# Patient Record
Sex: MALE | Race: OTHER | HISPANIC OR LATINO | URBAN - METROPOLITAN AREA
[De-identification: names, ages, dates, MRNs, and addresses within clinical notes are randomized per-mention and may not be internally consistent; named-entity substitution may affect disease eponyms.]

---

## 2020-03-30 ENCOUNTER — INPATIENT (INPATIENT)
Facility: HOSPITAL | Age: 50
LOS: 1 days | Discharge: HOME | DRG: 177 | End: 2020-04-01
Attending: INTERNAL MEDICINE | Admitting: INTERNAL MEDICINE
Payer: MEDICAID

## 2020-03-30 VITALS
SYSTOLIC BLOOD PRESSURE: 130 MMHG | RESPIRATION RATE: 20 BRPM | OXYGEN SATURATION: 94 % | DIASTOLIC BLOOD PRESSURE: 81 MMHG | TEMPERATURE: 98 F | HEART RATE: 100 BPM

## 2020-03-30 DIAGNOSIS — R68.89 OTHER GENERAL SYMPTOMS AND SIGNS: ICD-10-CM

## 2020-03-30 LAB
ALBUMIN SERPL ELPH-MCNC: 3.3 G/DL — LOW (ref 3.5–5)
ALP SERPL-CCNC: 67 U/L — SIGNIFICANT CHANGE UP (ref 40–120)
ALT FLD-CCNC: 68 U/L DA — HIGH (ref 10–60)
ANION GAP SERPL CALC-SCNC: 9 MMOL/L — SIGNIFICANT CHANGE UP (ref 5–17)
ANISOCYTOSIS BLD QL: SLIGHT — SIGNIFICANT CHANGE UP
AST SERPL-CCNC: 45 U/L — HIGH (ref 10–40)
BASOPHILS # BLD AUTO: 0 K/UL — SIGNIFICANT CHANGE UP (ref 0–0.2)
BASOPHILS NFR BLD AUTO: 0 % — SIGNIFICANT CHANGE UP (ref 0–2)
BILIRUB SERPL-MCNC: 0.8 MG/DL — SIGNIFICANT CHANGE UP (ref 0.2–1.2)
BUN SERPL-MCNC: 23 MG/DL — HIGH (ref 7–18)
CALCIUM SERPL-MCNC: 9 MG/DL — SIGNIFICANT CHANGE UP (ref 8.4–10.5)
CHLORIDE SERPL-SCNC: 108 MMOL/L — SIGNIFICANT CHANGE UP (ref 96–108)
CO2 SERPL-SCNC: 25 MMOL/L — SIGNIFICANT CHANGE UP (ref 22–31)
CREAT SERPL-MCNC: 0.86 MG/DL — SIGNIFICANT CHANGE UP (ref 0.5–1.3)
EOSINOPHIL # BLD AUTO: 0 K/UL — SIGNIFICANT CHANGE UP (ref 0–0.5)
EOSINOPHIL NFR BLD AUTO: 0 % — SIGNIFICANT CHANGE UP (ref 0–6)
GLUCOSE SERPL-MCNC: 121 MG/DL — HIGH (ref 70–99)
HCT VFR BLD CALC: 44.6 % — SIGNIFICANT CHANGE UP (ref 39–50)
HGB BLD-MCNC: 14.8 G/DL — SIGNIFICANT CHANGE UP (ref 13–17)
HYPOCHROMIA BLD QL: SLIGHT — SIGNIFICANT CHANGE UP
LYMPHOCYTES # BLD AUTO: 0.62 K/UL — LOW (ref 1–3.3)
LYMPHOCYTES # BLD AUTO: 8 % — LOW (ref 13–44)
MANUAL SMEAR VERIFICATION: SIGNIFICANT CHANGE UP
MCHC RBC-ENTMCNC: 28.7 PG — SIGNIFICANT CHANGE UP (ref 27–34)
MCHC RBC-ENTMCNC: 33.2 GM/DL — SIGNIFICANT CHANGE UP (ref 32–36)
MCV RBC AUTO: 86.4 FL — SIGNIFICANT CHANGE UP (ref 80–100)
MONOCYTES # BLD AUTO: 1.63 K/UL — HIGH (ref 0–0.9)
MONOCYTES NFR BLD AUTO: 21 % — HIGH (ref 2–14)
NEUTROPHILS # BLD AUTO: 5.36 K/UL — SIGNIFICANT CHANGE UP (ref 1.8–7.4)
NEUTROPHILS NFR BLD AUTO: 69 % — SIGNIFICANT CHANGE UP (ref 43–77)
NRBC # BLD: 0 /100 — SIGNIFICANT CHANGE UP (ref 0–0)
PLAT MORPH BLD: NORMAL — SIGNIFICANT CHANGE UP
PLATELET # BLD AUTO: 468 K/UL — HIGH (ref 150–400)
POIKILOCYTOSIS BLD QL AUTO: SLIGHT — SIGNIFICANT CHANGE UP
POLYCHROMASIA BLD QL SMEAR: SLIGHT — SIGNIFICANT CHANGE UP
POTASSIUM SERPL-MCNC: 4.4 MMOL/L — SIGNIFICANT CHANGE UP (ref 3.5–5.3)
POTASSIUM SERPL-SCNC: 4.4 MMOL/L — SIGNIFICANT CHANGE UP (ref 3.5–5.3)
PROT SERPL-MCNC: 8.4 G/DL — HIGH (ref 6–8.3)
RBC # BLD: 5.16 M/UL — SIGNIFICANT CHANGE UP (ref 4.2–5.8)
RBC # FLD: 13.6 % — SIGNIFICANT CHANGE UP (ref 10.3–14.5)
RBC BLD AUTO: ABNORMAL
SODIUM SERPL-SCNC: 142 MMOL/L — SIGNIFICANT CHANGE UP (ref 135–145)
TROPONIN I SERPL-MCNC: <0.015 NG/ML — SIGNIFICANT CHANGE UP (ref 0–0.04)
VARIANT LYMPHS # BLD: 2 % — SIGNIFICANT CHANGE UP (ref 0–6)
WBC # BLD: 7.77 K/UL — SIGNIFICANT CHANGE UP (ref 3.8–10.5)
WBC # FLD AUTO: 7.77 K/UL — SIGNIFICANT CHANGE UP (ref 3.8–10.5)

## 2020-03-30 PROCEDURE — 71045 X-RAY EXAM CHEST 1 VIEW: CPT | Mod: 26

## 2020-03-30 PROCEDURE — 99285 EMERGENCY DEPT VISIT HI MDM: CPT

## 2020-03-30 RX ORDER — ALBUTEROL 90 UG/1
4 AEROSOL, METERED ORAL ONCE
Refills: 0 | Status: COMPLETED | OUTPATIENT
Start: 2020-03-30 | End: 2020-03-30

## 2020-03-30 RX ADMIN — ALBUTEROL 4 PUFF(S): 90 AEROSOL, METERED ORAL at 20:17

## 2020-03-30 NOTE — ED PROVIDER NOTE - PHYSICAL EXAMINATION
CONSTITUTIONAL: Well appearing, awake, alert, oriented to person, place, time/situation and in no apparent distress. EYES: Clear bilaterally, pupils equal, round and reactive to light. CARDIAC: Tachycardic, regular rhythm.  Heart sounds S1, S2. RESPIRATORY: Mild distress. On ambulation tachypneic at RR 34 and O2 sat drops to 89-90% RA. Stops 1-2 times in the middle of the sentence to breathe. Bilateral crackles at bases. GASTROINTESTINAL: Abdomen soft, non-tender, no guarding. NEUROLOGICAL: Alert and oriented, no focal deficits, grossly intact. SKIN: Skin normal color for race, warm, dry and intact. No evidence of rash.

## 2020-03-30 NOTE — ED PROVIDER NOTE - ATTENDING CONTRIBUTION TO CARE
Agree with NP H&P.  49 year-old male presents with shortness of breath and myalgias.   Yesterday went to [facility] and was discharged with prescription for Azithromycin and Plaquenil. Pt presents with worsening symptoms.  initial sat 94% however patient desaturates to 89% with ambulation.  Likely covid, labs, CXR, supplemental oxygen, and admit.

## 2020-03-30 NOTE — ED PROVIDER NOTE - OBJECTIVE STATEMENT
ID# [id number] (Severiano): Poor historian. 49 year-old male, unsure of PMHx, presents with cc SOB. Reports that for the last 2 weeks has had fever, cough, V/D, body aches, "lung pain". Yesterday went to [facility] ED for SOB that started yesterday. Was discharged today with Rx for Azithromycin and Plaquenil. Now presents with worsening symptoms.

## 2020-03-31 DIAGNOSIS — Z29.9 ENCOUNTER FOR PROPHYLACTIC MEASURES, UNSPECIFIED: ICD-10-CM

## 2020-03-31 DIAGNOSIS — J18.9 PNEUMONIA, UNSPECIFIED ORGANISM: ICD-10-CM

## 2020-03-31 LAB
24R-OH-CALCIDIOL SERPL-MCNC: 18.1 NG/ML — LOW (ref 30–80)
ANION GAP SERPL CALC-SCNC: 6 MMOL/L — SIGNIFICANT CHANGE UP (ref 5–17)
BUN SERPL-MCNC: 17 MG/DL — SIGNIFICANT CHANGE UP (ref 7–18)
CALCIUM SERPL-MCNC: 8.6 MG/DL — SIGNIFICANT CHANGE UP (ref 8.4–10.5)
CHLORIDE SERPL-SCNC: 109 MMOL/L — HIGH (ref 96–108)
CHOLEST SERPL-MCNC: 137 MG/DL — SIGNIFICANT CHANGE UP (ref 10–199)
CK SERPL-CCNC: 66 U/L — SIGNIFICANT CHANGE UP (ref 35–232)
CO2 SERPL-SCNC: 28 MMOL/L — SIGNIFICANT CHANGE UP (ref 22–31)
CREAT SERPL-MCNC: 0.73 MG/DL — SIGNIFICANT CHANGE UP (ref 0.5–1.3)
CRP SERPL-MCNC: 5.76 MG/DL — HIGH (ref 0–0.4)
D DIMER BLD IA.RAPID-MCNC: 225 NG/ML DDU — SIGNIFICANT CHANGE UP
FERRITIN SERPL-MCNC: 994 NG/ML — HIGH (ref 30–400)
GLUCOSE SERPL-MCNC: 100 MG/DL — HIGH (ref 70–99)
HBA1C BLD-MCNC: 6.3 % — HIGH (ref 4–5.6)
HCT VFR BLD CALC: 40.3 % — SIGNIFICANT CHANGE UP (ref 39–50)
HDLC SERPL-MCNC: 24 MG/DL — LOW
HGB BLD-MCNC: 13.4 G/DL — SIGNIFICANT CHANGE UP (ref 13–17)
LDH SERPL L TO P-CCNC: 257 U/L — HIGH (ref 120–225)
LIPID PNL WITH DIRECT LDL SERPL: 79 MG/DL — SIGNIFICANT CHANGE UP
MAGNESIUM SERPL-MCNC: 2.6 MG/DL — SIGNIFICANT CHANGE UP (ref 1.6–2.6)
MCHC RBC-ENTMCNC: 28.6 PG — SIGNIFICANT CHANGE UP (ref 27–34)
MCHC RBC-ENTMCNC: 33.3 GM/DL — SIGNIFICANT CHANGE UP (ref 32–36)
MCV RBC AUTO: 86.1 FL — SIGNIFICANT CHANGE UP (ref 80–100)
NRBC # BLD: 0 /100 WBCS — SIGNIFICANT CHANGE UP (ref 0–0)
PHOSPHATE SERPL-MCNC: 3.7 MG/DL — SIGNIFICANT CHANGE UP (ref 2.5–4.5)
PLATELET # BLD AUTO: 431 K/UL — HIGH (ref 150–400)
POTASSIUM SERPL-MCNC: 3.7 MMOL/L — SIGNIFICANT CHANGE UP (ref 3.5–5.3)
POTASSIUM SERPL-SCNC: 3.7 MMOL/L — SIGNIFICANT CHANGE UP (ref 3.5–5.3)
RBC # BLD: 4.68 M/UL — SIGNIFICANT CHANGE UP (ref 4.2–5.8)
RBC # FLD: 13.7 % — SIGNIFICANT CHANGE UP (ref 10.3–14.5)
SARS-COV-2 RNA SPEC QL NAA+PROBE: DETECTED
SODIUM SERPL-SCNC: 143 MMOL/L — SIGNIFICANT CHANGE UP (ref 135–145)
TOTAL CHOLESTEROL/HDL RATIO MEASUREMENT: 5.7 RATIO — SIGNIFICANT CHANGE UP (ref 3.4–9.6)
TRIGL SERPL-MCNC: 169 MG/DL — HIGH (ref 10–149)
TSH SERPL-MCNC: 1.66 UU/ML — SIGNIFICANT CHANGE UP (ref 0.34–4.82)
WBC # BLD: 5.96 K/UL — SIGNIFICANT CHANGE UP (ref 3.8–10.5)
WBC # FLD AUTO: 5.96 K/UL — SIGNIFICANT CHANGE UP (ref 3.8–10.5)

## 2020-03-31 PROCEDURE — 99223 1ST HOSP IP/OBS HIGH 75: CPT

## 2020-03-31 RX ORDER — AZITHROMYCIN 500 MG/1
500 TABLET, FILM COATED ORAL DAILY
Refills: 0 | Status: COMPLETED | OUTPATIENT
Start: 2020-03-31 | End: 2020-03-31

## 2020-03-31 RX ORDER — CEFTRIAXONE 500 MG/1
1000 INJECTION, POWDER, FOR SOLUTION INTRAMUSCULAR; INTRAVENOUS EVERY 24 HOURS
Refills: 0 | Status: DISCONTINUED | OUTPATIENT
Start: 2020-03-31 | End: 2020-04-01

## 2020-03-31 RX ORDER — HYDROXYCHLOROQUINE SULFATE 200 MG
400 TABLET ORAL EVERY 12 HOURS
Refills: 0 | Status: COMPLETED | OUTPATIENT
Start: 2020-03-31 | End: 2020-03-31

## 2020-03-31 RX ORDER — ACETAMINOPHEN 500 MG
650 TABLET ORAL EVERY 6 HOURS
Refills: 0 | Status: DISCONTINUED | OUTPATIENT
Start: 2020-03-31 | End: 2020-04-01

## 2020-03-31 RX ORDER — AZITHROMYCIN 500 MG/1
250 TABLET, FILM COATED ORAL DAILY
Refills: 0 | Status: DISCONTINUED | OUTPATIENT
Start: 2020-04-01 | End: 2020-04-01

## 2020-03-31 RX ORDER — THIAMINE MONONITRATE (VIT B1) 100 MG
100 TABLET ORAL DAILY
Refills: 0 | Status: DISCONTINUED | OUTPATIENT
Start: 2020-03-31 | End: 2020-04-01

## 2020-03-31 RX ORDER — ENOXAPARIN SODIUM 100 MG/ML
40 INJECTION SUBCUTANEOUS DAILY
Refills: 0 | Status: DISCONTINUED | OUTPATIENT
Start: 2020-03-31 | End: 2020-04-01

## 2020-03-31 RX ORDER — HYDROXYCHLOROQUINE SULFATE 200 MG
TABLET ORAL
Refills: 0 | Status: DISCONTINUED | OUTPATIENT
Start: 2020-03-31 | End: 2020-04-01

## 2020-03-31 RX ORDER — HYDROXYCHLOROQUINE SULFATE 200 MG
200 TABLET ORAL EVERY 12 HOURS
Refills: 0 | Status: DISCONTINUED | OUTPATIENT
Start: 2020-04-01 | End: 2020-04-01

## 2020-03-31 RX ORDER — ASCORBIC ACID 60 MG
500 TABLET,CHEWABLE ORAL DAILY
Refills: 0 | Status: DISCONTINUED | OUTPATIENT
Start: 2020-03-31 | End: 2020-04-01

## 2020-03-31 RX ADMIN — ENOXAPARIN SODIUM 40 MILLIGRAM(S): 100 INJECTION SUBCUTANEOUS at 18:20

## 2020-03-31 RX ADMIN — CEFTRIAXONE 100 MILLIGRAM(S): 500 INJECTION, POWDER, FOR SOLUTION INTRAMUSCULAR; INTRAVENOUS at 06:16

## 2020-03-31 RX ADMIN — Medication 400 MILLIGRAM(S): at 13:25

## 2020-03-31 RX ADMIN — AZITHROMYCIN 500 MILLIGRAM(S): 500 TABLET, FILM COATED ORAL at 18:20

## 2020-03-31 RX ADMIN — Medication 100 MILLIGRAM(S): at 13:24

## 2020-03-31 RX ADMIN — Medication 400 MILLIGRAM(S): at 06:16

## 2020-03-31 RX ADMIN — Medication 500 MILLIGRAM(S): at 13:30

## 2020-03-31 NOTE — PROGRESS NOTE ADULT - PROBLEM SELECTOR PLAN 1
Chest x-ray shows bilateral infiltrate  Pt saturating about 96% on NC @ 3LPM  Suspected COVID-19  F/u COVID-19 PCR  Isolation precautions  Continue Ceftriaxone , Azithromycin  Continue Hydroxychloroquine, thiamin, vitamin C  F/u LDH, CRP, serum ferritin, D-dimer. G6PD  -EKG (ordered)  -Guaifenesin ordered PRN for cough  -Tylenol ordered PRN for temp>101.5F

## 2020-03-31 NOTE — DISCHARGE NOTE PROVIDER - NSDCCPCAREPLAN_GEN_ALL_CORE_FT
PRINCIPAL DISCHARGE DIAGNOSIS  Diagnosis: Suspected 2019 novel coronavirus infection  Assessment and Plan of Treatment: -Continue with Contact and Airborne Precautions as directed PRINCIPAL DISCHARGE DIAGNOSIS  Diagnosis: Suspected 2019 novel coronavirus infection  Assessment and Plan of Treatment: -Continue with Contact and Airborne Precautions as directed  CORONAVIRUS INSTRUCTIONS:   Based on your current clinical status and stability, it has been determined that you no longer need hospitalization and can recover while remaining in self-quarantine at home. You should follow the prevention steps below until a healthcare provider or local or state health department says you can return to your normal activities.   1. You should restrict activities outside your home, except for getting medical care.   2. Do not go to work, school, or public areas.   3. Avoid using public transportation, ride-sharing, or taxis.   4. Separate yourself from other people and animals in your home as much as possible.  When you are around other people (e.g., sharing a room or vehicle) you should wear a facemask.  5. Wash your hands often with soap and water for at least 20 seconds, especially after blowing your nose, coughing, or sneezing; going to the bathroom; and before eating or preparing food.  6. Cover your mouth and nose with a tissue when you cough or sneeze. Throw used tissues in a lined trash can. Immediately wash your hands with soap and water for at least 20 seconds  7. High touch surfaces include counters, tabletops, doorknobs, bathroom fixtures, toilets, phones, keyboards, tablets, and bedside tables.  8. Avoid sharing dishes, drinking glasses, cups, eating utensils, towels, or bedding with other people or pets in your home. After using these items, they should be washed thoroughly with soap and water.  You are strongly advised to seek prompt medical attention if your illness worsens or you develop new symptoms like fever or difficulty breathing.   Please call  [phone] to speak with your discharging physician if you have any questions.

## 2020-03-31 NOTE — DISCHARGE NOTE PROVIDER - NSDCFUADDAPPT_GEN_ALL_CORE_FT
Follow Up with Primary Care Provider in 1-2 weeks  if symptoms worsen return to ER  if unable to breath call [phone]

## 2020-03-31 NOTE — H&P ADULT - NSHPSOCIALHISTORY_GEN_ALL_CORE
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 He drinks alcohol occasionally. Denied smoking cigarettes. 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 He drinks alcohol occasionally. Denied smoking cigarettes.

## 2020-03-31 NOTE — H&P ADULT - ENMT
EKG completed Dr. Davila called to check in alpha for copy of ekg, EKG given to Dr. Lara . pt returned to stretcher after assisted to bathroom with 2 person assist. + diarrhea noted. jp care provided. pttol it well. details… No oral lesions; no gross abnormalities

## 2020-03-31 NOTE — PROGRESS NOTE ADULT - PROBLEM SELECTOR PLAN 2
Lovenox for DVT prophylaxis  encouraged patient to perform in-bed exercises of upper and lower extremities

## 2020-03-31 NOTE — H&P ADULT - ATTENDING COMMENTS
Pt seen and examined. Case discussed with Housestaff  Agree with HPI, assessment and plan as above.    Vital Signs Last 24 Hrs  T(C): 37.2 (31 Mar 2020 02:57), Max: 37.2 (31 Mar 2020 02:57)  T(F): 99 (31 Mar 2020 02:57), Max: 99 (31 Mar 2020 02:57)  HR: 81 (31 Mar 2020 02:57) (81 - 100)  BP: 109/64 (31 Mar 2020 02:57) (109/64 - 130/81)  RR: 20 (31 Mar 2020 02:57) (18 - 20)  SpO2: 96% (31 Mar 2020 02:57) (94% - 98%)    Middle aged man, NAD on 4LPM of O2 via NC.  SaO2 is 89-90% on room air  Decreased and rhonchorous breath sounds in both lower lobes  RRR S1S2 only   Soft NT ND BS +  No pedal edema  NO focal deficits    Labs                        14.8   7.77  )-----------( 468      ( 30 Mar 2020 19:18 )             44.6     03-30    142  |  108  |  23<H>  ----------------------------<  121<H>  4.4   |  25  |  0.86    Ca    9.0      30 Mar 2020 19:18    TPro  8.4<H>  /  Alb  3.3<L>  /  TBili  0.8  /  DBili  x   /  AST  45<H>  /  ALT  68<H>  /  AlkPhos  67  03-30    Trop negative  CXR - B/L lower lobe infiltrates    Impression  Acute respiratory failure  Suspected COVID 19 infection with bilateral multilobar pneumonia.    A/P  Admit to Medicine with COVID 19 isolation protocols  F/up covid 19 pcr; baseline crp /d-dimer /ferritin /l dh /procalcitonin  Empiric antibiotic with Zithromax and ceftriaxone  Plaquenil regimen  Continue supplemental O2 with continuous SaO2 monitor.  Supportive care- antitussive/antipyretics/pain control

## 2020-03-31 NOTE — H&P ADULT - PROBLEM SELECTOR PLAN 1
Chest x-ray shows bilateral infiltrate  Pt saturating about 94% on room air  Suspected COVID-19  F/u COVID-19 PCR  Isolation precautions  Continue Ceftriaxone , Azithromycin  Continue Hydroxychloroquine, thiamin, vitamin C  F/u LDH, CRP, serum ferritin, D-dimer. G6PD  Oxygen supplementation as needed

## 2020-03-31 NOTE — H&P ADULT - ASSESSMENT
Pt is a 50 y/o M with no significant PMH who presented with generalized body ache, diarrhoea, mild shortness of breath , fever since 2 weeks.   Pt is being admitted for multifocal pneumonia likely secondary to COVID-19

## 2020-03-31 NOTE — H&P ADULT - HISTORY OF PRESENT ILLNESS
Pt is a 50 y/o M with no significant PMH who presented with generlazied body ache, Pt is a 48 y/o M with no significant PMH who presented with generalized body ache, diarrhoea, mild shortness of breath , fever since 2 weeks. According to the pt, he has been having generalized back pain, more on his back since 2 weeks. He has had 5 episodes of  yellowish watery diarrhoea so far. He has mild shortness of breath on exertion. He has subjective low grade fever.  He denied cough, chest pain, abdominal pain, nausea, vomiting and other complains.   He stated that, his brother , who lives with him was diagnosed corona virus. He denied recent travel. He works at kitchen in a restaurant. Pt is a 48 y/o M with no significant PMH who presented with generalized body ache, diarrhoea, mild shortness of breath , fever since 2 weeks. According to the pt, he has been having generalized back pain, more on his back since 2 weeks. He has had 5 episodes of  yellowish watery diarrhoea so far. He has mild shortness of breath on exertion. He has subjective low grade fever.  He denied cough, chest pain, abdominal pain, nausea, vomiting and other complains.   He went to [facility] yesterday and was discharged with azithromycin and Hydroxychloroquine.  He stated that, his brother , who lives with him was diagnosed corona virus. He denied recent travel. He works at kitchen in a restaurant.

## 2020-03-31 NOTE — ED ADULT NURSE NOTE - NSIMPLEMENTINTERV_GEN_ALL_ED
Implemented All Universal Safety Interventions:  Nursery to call system. Call bell, personal items and telephone within reach. Instruct patient to call for assistance. Room bathroom lighting operational. Non-slip footwear when patient is off stretcher. Physically safe environment: no spills, clutter or unnecessary equipment. Stretcher in lowest position, wheels locked, appropriate side rails in place.

## 2020-03-31 NOTE — PATIENT PROFILE ADULT - NSPROGENOTHERPROVIDER_GEN_A_NUR
Informed patient of Dr. Stoddard' recommendation at documented below.  Patient indicated understanding and agreed with the plan.  Will route to Dr. El's office for FYI of patient's recent pulse rate of 40 on 7/18/17.   none

## 2020-03-31 NOTE — PROGRESS NOTE ADULT - SUBJECTIVE AND OBJECTIVE BOX
HPI:  Pt is a 48 y/o M with no significant PMH who presented with generalized body ache, diarrhoea, mild shortness of breath , fever since 2 weeks. According to the pt, he has been having generalized back pain, more on his back since 2 weeks. He has had 5 episodes of  yellowish watery diarrhoea so far. He has mild shortness of breath on exertion. He has subjective low grade fever.  He denied cough, chest pain, abdominal pain, nausea, vomiting and other complains.   He went to [facility] yesterday and was discharged with azithromycin and Hydroxychloroquine.  He stated that, his brother , who lives with him was diagnosed corona virus. He denied recent travel. 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  Patient c/o SOB, productive cough with clear sputum.  Denies Chills. No cp, abdominal pain , N/V/D    MEDICATIONS  (STANDING):  ascorbic acid 500 milliGRAM(s) Oral daily  azithromycin   Tablet 500 milliGRAM(s) Oral daily  cefTRIAXone   IVPB 1000 milliGRAM(s) IV Intermittent every 24 hours  enoxaparin Injectable 40 milliGRAM(s) SubCutaneous daily  hydroxychloroquine   Oral   hydroxychloroquine 400 milliGRAM(s) Oral every 12 hours  thiamine 100 milliGRAM(s) Oral daily        Vital Signs Last 24 Hrs  T(C): 36.8 (31 Mar 2020 08:31), Max: 37.2 (31 Mar 2020 02:57)  T(F): 98.2 (31 Mar 2020 08:31), Max: 99 (31 Mar 2020 02:57)  HR: 77 (31 Mar 2020 08:31) (77 - 100)  BP: 109/70 (31 Mar 2020 08:31) (109/64 - 130/81)  BP(mean): --  RR: 20 (31 Mar 2020 08:31) (18 - 20)  SpO2: 97% (31 Mar 2020 08:31) (94% - 98%)      PHYSICAL EXAM:  GENERAL: NAD, well-developed, Nasal Cannula @ 3LPM  EYES: EOMI, PERRLA, conjunctiva and sclera clear  CHEST/LUNG: breathing non-labored, Clear to auscultation bilaterally; No wheeze  HEART: Regular rate and rhythm;  ABDOMEN: Soft, Nontender, Nondistended;  EXTREMITIES:  2+ Peripheral Pulses, No clubbing, cyanosis, or edema  PSYCH: AAOx3  SKIN: No rashes or lesions    LABS:   COVID = pending	                     13.4   5.96  )-----------( 431      ( 31 Mar 2020 08:00 )             40.3     03-31    143  |  109<H>  |  17  ----------------------------<  100<H>  3.7   |  28  |  0.73    Ca    8.6      31 Mar 2020 08:00  Phos  3.7     03-31  Mg     2.6     03-31    TPro  8.4<H>  /  Alb  3.3<L>  /  TBili  0.8  /  DBili  x   /  AST  45<H>  /  ALT  68<H>  /  AlkPhos  67  03-30        CARDIAC MARKERS ( 31 Mar 2020 08:00 )  x     / x     / 66 U/L / x     / x      CARDIAC MARKERS ( 30 Mar 2020 19:18 )  <0.015 ng/mL / x     / x     / x     / x        RADIOLOGY & ADDITIONAL TESTS:  < from: Xray Chest 1 View- PORTABLE-Urgent (03.30.20 @ 19:43) >  IMPRESSION:   Bilateral multifocal scattered them a pulmonary opacities as described. Findings concerning for bacterial or viral pneumonia..    < end of copied text >

## 2020-03-31 NOTE — H&P ADULT - NEUROLOGICAL DETAILS
deep reflexes intact/alert and oriented x 3/sensation intact/cranial nerves intact/superficial reflexes intact/normal strength

## 2020-03-31 NOTE — H&P ADULT - NSHPLABSRESULTS_GEN_ALL_CORE
14.8   7.77  )-----------( 468      ( 30 Mar 2020 19:18 )             44.6     03-30    142  |  108  |  23<H>  ----------------------------<  121<H>  4.4   |  25  |  0.86    Ca    9.0      30 Mar 2020 19:18    TPro  8.4<H>  /  Alb  3.3<L>  /  TBili  0.8  /  DBili  x   /  AST  45<H>  /  ALT  68<H>  /  AlkPhos  67  03-30

## 2020-03-31 NOTE — PROGRESS NOTE ADULT - ASSESSMENT
Pt is a 48 y/o M with no significant PMH who presented with generalized body ache, diarrhoea, mild shortness of breath , fever since 2 weeks.   Pt is being admitted for multifocal pneumonia likely secondary to COVID-19

## 2020-03-31 NOTE — DISCHARGE NOTE PROVIDER - HOSPITAL COURSE
Pt is a 48 y/o M with no significant PMH who presented with generalized body ache, diarrhoea, mild shortness of breath , fever since 2 weeks. According to the pt, he has been having generalized back pain, more on his back since 2 weeks. He has had 5 episodes of  yellowish watery diarrhoea so far. He has mild shortness of breath on exertion. He has subjective low grade fever. He went to [facility] yesterday and was discharged with azithromycin and Hydroxychloroquine. He stated that, his brother , who lives with him was diagnosed corona virus. He denied recent travel. He works at kitchen in a restaurant.         In ED Pt saturating about 94% on room air. Chest x-ray shows bilateral infiltrate. Pt is being admitted for multifocal pneumonia likely secondary to COVID-19. Pt was started on Ceftriaxone , Azithromycin, Hydroxychloroquine, thiamin, & vitamin C. COVID-19 is pending. Pt is a 48 y/o M with no significant PMH who presented with generalized body ache, diarrhoea, mild shortness of breath , fever since 2 weeks. According to the pt, he has been having generalized back pain, more on his back since 2 weeks. He has had 5 episodes of  yellowish watery diarrhoea so far. He has mild shortness of breath on exertion. He has subjective low grade fever. He went to [facility] yesterday and was discharged with azithromycin and Hydroxychloroquine. He stated that, his brother , who lives with him was diagnosed corona virus. He denied recent travel. He works at kitchen in a restaurant.         In ED Pt saturating about 94% on room air. Chest x-ray shows bilateral infiltrate. Pt is being admitted for multifocal pneumonia likely secondary to COVID-19. Pt was started on Ceftriaxone , Azithromycin, Hydroxychloroquine, thiamin, & vitamin C. COVID-19 is pending.         On 4/1/20, patient is tolerating O2 nasal cannula and saturating 100%. Stable for discharge.

## 2020-03-31 NOTE — H&P ADULT - NEGATIVE OPHTHALMOLOGIC SYMPTOMS
no photophobia/no diplopia/no blurred vision L/no lacrimation L/no lacrimation R/no blurred vision R

## 2020-03-31 NOTE — DISCHARGE NOTE PROVIDER - CARE PROVIDER_API CALL
Mervat Sheth)  Internal Medicine  [address] [address] [place] [address]  Phone: [phone]  Fax: [phone]  Follow Up Time:

## 2020-04-01 VITALS
OXYGEN SATURATION: 96 % | SYSTOLIC BLOOD PRESSURE: 112 MMHG | TEMPERATURE: 98 F | DIASTOLIC BLOOD PRESSURE: 77 MMHG | HEART RATE: 91 BPM | RESPIRATION RATE: 20 BRPM

## 2020-04-01 LAB
ALBUMIN SERPL ELPH-MCNC: 2.8 G/DL — LOW (ref 3.5–5)
ALP SERPL-CCNC: 63 U/L — SIGNIFICANT CHANGE UP (ref 40–120)
ALT FLD-CCNC: 52 U/L DA — SIGNIFICANT CHANGE UP (ref 10–60)
ANION GAP SERPL CALC-SCNC: 9 MMOL/L — SIGNIFICANT CHANGE UP (ref 5–17)
AST SERPL-CCNC: 27 U/L — SIGNIFICANT CHANGE UP (ref 10–40)
BASOPHILS # BLD AUTO: 0.03 K/UL — SIGNIFICANT CHANGE UP (ref 0–0.2)
BASOPHILS NFR BLD AUTO: 0.5 % — SIGNIFICANT CHANGE UP (ref 0–2)
BILIRUB SERPL-MCNC: 0.5 MG/DL — SIGNIFICANT CHANGE UP (ref 0.2–1.2)
BUN SERPL-MCNC: 11 MG/DL — SIGNIFICANT CHANGE UP (ref 7–18)
CALCIUM SERPL-MCNC: 8.9 MG/DL — SIGNIFICANT CHANGE UP (ref 8.4–10.5)
CHLORIDE SERPL-SCNC: 109 MMOL/L — HIGH (ref 96–108)
CO2 SERPL-SCNC: 25 MMOL/L — SIGNIFICANT CHANGE UP (ref 22–31)
CREAT SERPL-MCNC: 0.75 MG/DL — SIGNIFICANT CHANGE UP (ref 0.5–1.3)
EOSINOPHIL # BLD AUTO: 0.05 K/UL — SIGNIFICANT CHANGE UP (ref 0–0.5)
EOSINOPHIL NFR BLD AUTO: 0.9 % — SIGNIFICANT CHANGE UP (ref 0–6)
GLUCOSE SERPL-MCNC: 84 MG/DL — SIGNIFICANT CHANGE UP (ref 70–99)
HCT VFR BLD CALC: 42.9 % — SIGNIFICANT CHANGE UP (ref 39–50)
HGB BLD-MCNC: 14 G/DL — SIGNIFICANT CHANGE UP (ref 13–17)
IMM GRANULOCYTES NFR BLD AUTO: 1.6 % — HIGH (ref 0–1.5)
LYMPHOCYTES # BLD AUTO: 1.28 K/UL — SIGNIFICANT CHANGE UP (ref 1–3.3)
LYMPHOCYTES # BLD AUTO: 22.4 % — SIGNIFICANT CHANGE UP (ref 13–44)
MAGNESIUM SERPL-MCNC: 2.6 MG/DL — SIGNIFICANT CHANGE UP (ref 1.6–2.6)
MCHC RBC-ENTMCNC: 29.1 PG — SIGNIFICANT CHANGE UP (ref 27–34)
MCHC RBC-ENTMCNC: 32.6 GM/DL — SIGNIFICANT CHANGE UP (ref 32–36)
MCV RBC AUTO: 89.2 FL — SIGNIFICANT CHANGE UP (ref 80–100)
MONOCYTES # BLD AUTO: 0.79 K/UL — SIGNIFICANT CHANGE UP (ref 0–0.9)
MONOCYTES NFR BLD AUTO: 13.8 % — SIGNIFICANT CHANGE UP (ref 2–14)
NEUTROPHILS # BLD AUTO: 3.48 K/UL — SIGNIFICANT CHANGE UP (ref 1.8–7.4)
NEUTROPHILS NFR BLD AUTO: 60.8 % — SIGNIFICANT CHANGE UP (ref 43–77)
NRBC # BLD: 0 /100 WBCS — SIGNIFICANT CHANGE UP (ref 0–0)
PHOSPHATE SERPL-MCNC: 3.9 MG/DL — SIGNIFICANT CHANGE UP (ref 2.5–4.5)
PLATELET # BLD AUTO: 419 K/UL — HIGH (ref 150–400)
POTASSIUM SERPL-MCNC: 3.8 MMOL/L — SIGNIFICANT CHANGE UP (ref 3.5–5.3)
POTASSIUM SERPL-SCNC: 3.8 MMOL/L — SIGNIFICANT CHANGE UP (ref 3.5–5.3)
PROT SERPL-MCNC: 7.3 G/DL — SIGNIFICANT CHANGE UP (ref 6–8.3)
RBC # BLD: 4.81 M/UL — SIGNIFICANT CHANGE UP (ref 4.2–5.8)
RBC # FLD: 13.3 % — SIGNIFICANT CHANGE UP (ref 10.3–14.5)
SODIUM SERPL-SCNC: 143 MMOL/L — SIGNIFICANT CHANGE UP (ref 135–145)
WBC # BLD: 5.72 K/UL — SIGNIFICANT CHANGE UP (ref 3.8–10.5)
WBC # FLD AUTO: 5.72 K/UL — SIGNIFICANT CHANGE UP (ref 3.8–10.5)

## 2020-04-01 PROCEDURE — 82955 ASSAY OF G6PD ENZYME: CPT

## 2020-04-01 PROCEDURE — 71045 X-RAY EXAM CHEST 1 VIEW: CPT

## 2020-04-01 PROCEDURE — 80048 BASIC METABOLIC PNL TOTAL CA: CPT

## 2020-04-01 PROCEDURE — 82550 ASSAY OF CK (CPK): CPT

## 2020-04-01 PROCEDURE — 85379 FIBRIN DEGRADATION QUANT: CPT

## 2020-04-01 PROCEDURE — 84443 ASSAY THYROID STIM HORMONE: CPT

## 2020-04-01 PROCEDURE — 80053 COMPREHEN METABOLIC PANEL: CPT

## 2020-04-01 PROCEDURE — 83036 HEMOGLOBIN GLYCOSYLATED A1C: CPT

## 2020-04-01 PROCEDURE — 82728 ASSAY OF FERRITIN: CPT

## 2020-04-01 PROCEDURE — 86140 C-REACTIVE PROTEIN: CPT

## 2020-04-01 PROCEDURE — 36415 COLL VENOUS BLD VENIPUNCTURE: CPT

## 2020-04-01 PROCEDURE — 83735 ASSAY OF MAGNESIUM: CPT

## 2020-04-01 PROCEDURE — 83615 LACTATE (LD) (LDH) ENZYME: CPT

## 2020-04-01 PROCEDURE — 99285 EMERGENCY DEPT VISIT HI MDM: CPT | Mod: 25

## 2020-04-01 PROCEDURE — 84100 ASSAY OF PHOSPHORUS: CPT

## 2020-04-01 PROCEDURE — 94640 AIRWAY INHALATION TREATMENT: CPT

## 2020-04-01 PROCEDURE — 80061 LIPID PANEL: CPT

## 2020-04-01 PROCEDURE — 93005 ELECTROCARDIOGRAM TRACING: CPT

## 2020-04-01 PROCEDURE — 84484 ASSAY OF TROPONIN QUANT: CPT

## 2020-04-01 PROCEDURE — 82306 VITAMIN D 25 HYDROXY: CPT

## 2020-04-01 PROCEDURE — 85027 COMPLETE CBC AUTOMATED: CPT

## 2020-04-01 PROCEDURE — 87635 SARS-COV-2 COVID-19 AMP PRB: CPT

## 2020-04-01 PROCEDURE — 99238 HOSP IP/OBS DSCHRG MGMT 30/<: CPT

## 2020-04-01 RX ORDER — ASCORBIC ACID 60 MG
1 TABLET,CHEWABLE ORAL
Qty: 0 | Refills: 0 | DISCHARGE
Start: 2020-04-01

## 2020-04-01 RX ADMIN — Medication 100 MILLIGRAM(S): at 14:04

## 2020-04-01 RX ADMIN — ENOXAPARIN SODIUM 40 MILLIGRAM(S): 100 INJECTION SUBCUTANEOUS at 14:05

## 2020-04-01 RX ADMIN — Medication 200 MILLIGRAM(S): at 02:09

## 2020-04-01 RX ADMIN — AZITHROMYCIN 250 MILLIGRAM(S): 500 TABLET, FILM COATED ORAL at 14:04

## 2020-04-01 RX ADMIN — Medication 500 MILLIGRAM(S): at 14:04

## 2020-04-01 RX ADMIN — Medication 200 MILLIGRAM(S): at 14:04

## 2020-04-01 RX ADMIN — CEFTRIAXONE 100 MILLIGRAM(S): 500 INJECTION, POWDER, FOR SOLUTION INTRAMUSCULAR; INTRAVENOUS at 05:17

## 2020-04-01 NOTE — DISCHARGE NOTE NURSING/CASE MANAGEMENT/SOCIAL WORK - PATIENT PORTAL LINK FT
You can access the FollowMyHealth Patient Portal offered by [facility] by registering at the following website: [email or web address]. By joining [employer]’s FollowMyHealth portal, you will also be able to view your health information using other applications (apps) compatible with our system.

## 2020-04-01 NOTE — PROGRESS NOTE ADULT - SUBJECTIVE AND OBJECTIVE BOX
AMERICA MCMAHAN  49y  Male    Subjective:   Patient seen and evaluated at bedside. Patient sitting up in bed.             Vital Signs Last 24 Hrs  T(C): 36.6 (01 Apr 2020 08:22), Max: 37.2 (31 Mar 2020 17:27)  T(F): 97.9 (01 Apr 2020 08:22), Max: 98.9 (31 Mar 2020 17:27)  HR: 77 (01 Apr 2020 08:22) (76 - 99)  BP: 109/76 (01 Apr 2020 08:22) (109/75 - 135/73)  BP(mean): --  RR: 18 (01 Apr 2020 08:22) (18 - 19)  SpO2: 99% (01 Apr 2020 08:22) (95% - 100%)    PHYSICAL EXAM:  GENERAL: NAD, well-groomed, well-developed  HEENT - NC/AT, pupils equal and reactive to light,  ; Moist mucous membranes, Good dentition, No lesions  NECK: Supple, No JVD  CHEST/LUNG: Clear to auscultation bilaterally; No rales, rhonchi, wheezing  HEART: Regular rate and rhythm; No murmurs, rubs, or gallops  ABDOMEN: Soft, Nontender, Nondistended; Bowel sounds present  EXTREMITIES:  2+ Peripheral Pulses, No clubbing, cyanosis, or edema  NEURO:  No Focal deficits, sensory and motor intact  SKIN: No rashes or lesions      MedsMEDICATIONS  (STANDING):  ascorbic acid 500 milliGRAM(s) Oral daily  azithromycin   Tablet 250 milliGRAM(s) Oral daily  cefTRIAXone   IVPB 1000 milliGRAM(s) IV Intermittent every 24 hours  enoxaparin Injectable 40 milliGRAM(s) SubCutaneous daily  hydroxychloroquine   Oral   hydroxychloroquine 200 milliGRAM(s) Oral every 12 hours  thiamine 100 milliGRAM(s) Oral daily    MEDICATIONS  (PRN):  acetaminophen   Tablet .. 650 milliGRAM(s) Oral every 6 hours PRN Temp greater or equal to 38.5C (101.3F)  guaiFENesin   Syrup  (Sugar-Free) 200 milliGRAM(s) Oral every 6 hours PRN Cough      HEALTH ISSUES - PROBLEM Dx:  Prophylactic measure: Prophylactic measure  Multifocal pneumonia: Multifocal pneumonia

## 2020-04-02 LAB — G6PD RBC-CCNC: 14.9 U/G HGB — SIGNIFICANT CHANGE UP (ref 7–20.5)

## 2024-01-04 NOTE — ED PROVIDER NOTE - CLINICAL SUMMARY MEDICAL DECISION MAKING FREE TEXT BOX
Hypoxic, tachypneic with minimal acitivity and when talking. Concern for COVID-19. Will bring to main ED. Report given to Dr Orozco. Most likely will need admission. Universal Safety Interventions

## 2025-06-16 NOTE — PATIENT PROFILE ADULT - NSTRANSFERBELONGINGSRESP_GEN_A_NUR
75-year-old male with past medical history of anemia, hypertension, medical history of CAD, aortic stenosis (diameter 1.8cm) now presents with a chief complaint of lightheadedness and chest pain. Patient reports that 4 days prior he had chest pain with grossly unremarkable workup but endorses feeling well since then. Patient tells me he woke up and felt lightheaded with associated chest pain, nausea, shortness of breath. Patient reports he is not currently lightheaded and denies any room spinning or vertiginous symptoms. Patient reports scant chest pain currently present which he endorses is a pressure which is substernal but does not radiate. Patient denies any other associated symptoms including fevers, cough, leg swelling, unilateral weakness.     Follows with Dr. Avalos.    Cardiology consulted for CP.    The patient is a very pleasant 75-year-old man with a history of mild aortic stenosis and nonobstructive CAD by catheterization in 2021 presenting to the emergency room with some waxing and waning chest discomfort of a nonexertional in nature.  His EKGs normal in his initial troponin is negative appears seen in the ER for similar symptoms in the last several days.  We will perform an echocardiogram, and assuming no significant rise in troponin, a nuclear stress test in the morning.   yes